# Patient Record
Sex: MALE | Race: ASIAN | NOT HISPANIC OR LATINO | URBAN - METROPOLITAN AREA
[De-identification: names, ages, dates, MRNs, and addresses within clinical notes are randomized per-mention and may not be internally consistent; named-entity substitution may affect disease eponyms.]

---

## 2021-12-23 ENCOUNTER — APPOINTMENT (RX ONLY)
Dept: URBAN - METROPOLITAN AREA CLINIC 295 | Facility: CLINIC | Age: 26
Setting detail: DERMATOLOGY
End: 2021-12-23

## 2021-12-23 DIAGNOSIS — L73.0 ACNE KELOID: ICD-10-CM | Status: WORSENING

## 2021-12-23 DIAGNOSIS — L50.1 IDIOPATHIC URTICARIA: ICD-10-CM | Status: WORSENING

## 2021-12-23 PROCEDURE — ? FULL BODY SKIN EXAM - DECLINED

## 2021-12-23 PROCEDURE — ? PRESCRIPTION MEDICATION MANAGEMENT

## 2021-12-23 PROCEDURE — 99204 OFFICE O/P NEW MOD 45 MIN: CPT

## 2021-12-23 PROCEDURE — ? PRESCRIPTION

## 2021-12-23 PROCEDURE — ? COUNSELING

## 2021-12-23 PROCEDURE — ? SCREENING FOR COVID-19

## 2021-12-23 PROCEDURE — ? ADDITIONAL NOTES

## 2021-12-23 RX ORDER — MINOCYCLINE HYDROCHLORIDE 100 MG/1
CAPSULE ORAL BID
Qty: 180 | Refills: 3 | Status: ERX | COMMUNITY
Start: 2021-12-23

## 2021-12-23 RX ORDER — BETAMETHASONE DIPROPIONATE 0.5 MG/G
CREAM, AUGMENTED TOPICAL
Qty: 50 | Refills: 2 | Status: ERX | COMMUNITY
Start: 2021-12-23

## 2021-12-23 RX ADMIN — MINOCYCLINE HYDROCHLORIDE: 100 CAPSULE ORAL at 00:00

## 2021-12-23 RX ADMIN — BETAMETHASONE DIPROPIONATE: 0.5 CREAM, AUGMENTED TOPICAL at 00:00

## 2021-12-23 ASSESSMENT — LOCATION SIMPLE DESCRIPTION DERM
LOCATION SIMPLE: LEFT FOREARM
LOCATION SIMPLE: POSTERIOR NECK
LOCATION SIMPLE: RIGHT FOREARM

## 2021-12-23 ASSESSMENT — LOCATION ZONE DERM
LOCATION ZONE: ARM
LOCATION ZONE: NECK

## 2021-12-23 ASSESSMENT — SEVERITY ASSESSMENT: SEVERITY: MILD TO MODERATE

## 2021-12-23 ASSESSMENT — LOCATION DETAILED DESCRIPTION DERM
LOCATION DETAILED: MID POSTERIOR NECK
LOCATION DETAILED: RIGHT PROXIMAL DORSAL FOREARM
LOCATION DETAILED: LEFT PROXIMAL DORSAL FOREARM

## 2021-12-23 NOTE — PROCEDURE: MIPS QUALITY
Quality 110: Preventive Care And Screening: Influenza Immunization: Influenza Immunization Administered during Influenza season
Quality 226: Preventive Care And Screening: Tobacco Use: Screening And Cessation Intervention: Patient screened for tobacco use and is an ex/non-smoker
Quality 130: Documentation Of Current Medications In The Medical Record: Current Medications Documented
Quality 128: Preventive Care And Screening: Body Mass Index (Bmi) Screening And Follow-Up Plan: BMI not documented, reason not otherwise specified.
Detail Level: Detailed
Quality 431: Preventive Care And Screening: Unhealthy Alcohol Use - Screening: Patient not identified as an unhealthy alcohol user when screened for unhealthy alcohol use using a systematic screening method

## 2021-12-23 NOTE — PROCEDURE: PRESCRIPTION MEDICATION MANAGEMENT
Initiate Treatment: Xyzal twice a day\\nWarm showers recommended \\nBetamethasone cream
Detail Level: Zone
Render In Strict Bullet Format?: No
Initiate Treatment: Minocycline 100 mg bid